# Patient Record
Sex: FEMALE | Race: BLACK OR AFRICAN AMERICAN | NOT HISPANIC OR LATINO
[De-identification: names, ages, dates, MRNs, and addresses within clinical notes are randomized per-mention and may not be internally consistent; named-entity substitution may affect disease eponyms.]

---

## 2020-10-06 ENCOUNTER — DASHBOARD ENCOUNTERS (OUTPATIENT)
Age: 50
End: 2020-10-06

## 2020-10-06 ENCOUNTER — OFFICE VISIT (OUTPATIENT)
Dept: URBAN - METROPOLITAN AREA CLINIC 52 | Facility: CLINIC | Age: 50
End: 2020-10-06
Payer: COMMERCIAL

## 2020-10-06 ENCOUNTER — TELEPHONE ENCOUNTER (OUTPATIENT)
Dept: URBAN - METROPOLITAN AREA CLINIC 92 | Facility: CLINIC | Age: 50
End: 2020-10-06

## 2020-10-06 ENCOUNTER — WEB ENCOUNTER (OUTPATIENT)
Dept: URBAN - METROPOLITAN AREA CLINIC 52 | Facility: CLINIC | Age: 50
End: 2020-10-06

## 2020-10-06 DIAGNOSIS — K59.01 CONSTIPATION: ICD-10-CM

## 2020-10-06 PROBLEM — 14760008 CONSTIPATION: Status: ACTIVE | Noted: 2020-10-06

## 2020-10-06 PROCEDURE — 99213 OFFICE O/P EST LOW 20 MIN: CPT | Performed by: INTERNAL MEDICINE

## 2020-10-06 RX ORDER — LACTULOSE 10 G/15ML
TAKE 30 MILLILITERS BY ORAL ROUTE 4 TIMES A DAY AS NEEDED SOLUTION ORAL
Qty: 1800 | Refills: 5 | Status: DISCONTINUED | COMMUNITY
Start: 2017-03-02

## 2020-10-06 RX ORDER — LINACLOTIDE 290 UG/1
1 CAPSULE AT LEAST 30 MINUTES BEFORE THE FIRST MEAL OF THE DAY ON AN EMPTY STOMACH CAPSULE, GELATIN COATED ORAL ONCE A DAY
Qty: 30 | Refills: 11 | OUTPATIENT
Start: 2020-10-06 | End: 2021-10-01

## 2020-10-06 NOTE — HPI-TODAY'S VISIT:
Chronic constipation Used Linzess at ? dose Still must use 2-3 Dulcolax along with it. Lactulose not work well. No abd pain or diarrhea or blood in stool [id number] She lost 45 lb on "cleanse" diet